# Patient Record
(demographics unavailable — no encounter records)

---

## 2024-10-24 NOTE — HISTORY OF PRESENT ILLNESS
[de-identified] : cough x 1 week, vomit starting this am as per dad. No other c/o.  [FreeTextEntry6] : Coughing x 1 week, now vomiting since this morning. Complaining of sore neck. Tolerated lunch. He is afebrile.

## 2024-10-24 NOTE — DISCUSSION/SUMMARY
[FreeTextEntry1] : Discussed with family that current strep testing is NEGATIVE . A regular throat culture will be sent to the lab for further testing, with results obtained in 24-48 hours. If the throat culture is positive, a prescription will be sent to the designated  pharmacy. If the throat culture is negative after 48 hours and the patient is not better, the child should be rechecked. Discussed with family the etiology, natural course and treatment options for sore throat-pharyngitis. Recommended OTC therapy with pain/fever control products, topical products (lozenges, sprays, gargles) as needed per manufacturers recommendation.  If throat culture is positive give- Cefadroxil 250mg/5ml, 7.5ml BID x 10 days

## 2024-10-25 NOTE — REVIEW OF SYSTEMS
[Headache] : no headache [Ear Pain] : ear pain [Nasal Congestion] : nasal congestion [Sore Throat] : sore throat [Cough] : cough [Shortness of Breath] : no shortness of breath [Vomiting] : vomiting [Diarrhea] : no diarrhea [Abdominal Pain] : no abdominal pain [Negative] : Skin

## 2024-10-25 NOTE — HISTORY OF PRESENT ILLNESS
[de-identified] : dad reports pt w cough x 1 week, vom yesterday, seen in office yesterday -strep negative, today pt c/o L ear pain, +UO, appetite OK, dad denies fever, SOB, wheezing, dc from ear; ibuprofen given at 1230pm today [FreeTextEntry6] : Cough and congestion x 1 week, intermittent vomiting, ST and now has R ear pain.  He was seen yesterday and had a negative strep test. No fevers.

## 2024-10-25 NOTE — DISCUSSION/SUMMARY
[FreeTextEntry1] :  Recommend supportive care including Mucinex, Flonase, antipyretics, fluids, and nasal saline followed by nasal suction. Return if symptoms worsen or persist.

## 2025-02-17 NOTE — HISTORY OF PRESENT ILLNESS
[de-identified] : Stomach pain, sore throat, diarrhea and fever. Ibuprofen given today  [FreeTextEntry6] : BAHMAN  is here today for a history of fever, cough, myalgias symptoms started yesterday night fever hot last night tactile,  today 102, cough  muscle aches  diarrhea x1 appetite normal no vomiting, has sore throat sibling had pneumonia about 1.5 weeks ago returned from vacation

## 2025-02-17 NOTE — HISTORY OF PRESENT ILLNESS
[de-identified] : Stomach pain, sore throat, diarrhea and fever. Ibuprofen given today  [FreeTextEntry6] : BAHMAN  is here today for a history of fever, cough, myalgias symptoms started yesterday night fever hot last night tactile,  today 102, cough  muscle aches  diarrhea x1 appetite normal no vomiting, has sore throat sibling had pneumonia about 1.5 weeks ago returned from vacation

## 2025-02-17 NOTE — PHYSICAL EXAM
[TextEntry] : Gen: Awake, alert,  In no acute distress , well appearing Eyes: no periorbital swelling Ears : right  External Auditory Canal:  Normal. Tympanic Membrane:  Normal            left External Auditory Canal:  Normal   Tympanic Membrane:  Normal Nose:  nasal discharge clear Pharynx; erythema , no sores no trismus  Neck supple Lymph: anterior and submandibular glands no lymphadenopathy Cardiac : normal rate, regular rhythm, S1,S2 normal, no murmur Lungs: clear to auscultation, no crackles no wheeze, no grunting flaring or retractions Abdomen: soft, not tender, no hepatosplenomegaly

## 2025-02-17 NOTE — DISCUSSION/SUMMARY
[FreeTextEntry1] : Parent/guardian aware rapid Covid 19 test negative rapid strep negative Discussed with family/pt that rapid influenza testing was POSITIVE for Influenza  A   Handwashing and Infection control     Symptomatic treatment discussed importance of fluid intake,  otc fever reducer. Next  Visit  - recheck if still febrile or symptomatic in 2 to 3 days, earlier if worsening symptoms Tamiflu pros and cons discussed, side effects including hives, hallucinations, vomiting and abdominal pain symptoms onset less than 48 hours, dad would like tamiflu. Rx sent need to start today if develops side effects stop medication. Dad would like sibling sister Chapo to receive prophylaxis medication due to exposure, 3 years old, history of recent pneumonia and rsv, medication sent once a day for  7 days. If develops side effects stop medication time spent 30 minutes discussed consider flu vaccine in future

## 2025-05-08 NOTE — HISTORY OF PRESENT ILLNESS
[de-identified] : right swollen with discharge  [FreeTextEntry6] : In addition to above: Sneezing, runny nose, watery, "teary" eye Small amount of "white" discharge in right eye at school today Spent time outdoors while in school No fevers, cough, V/D +HX of seasonal allergies Currently using Claritin

## 2025-05-08 NOTE — CARE PLAN
[Care Plan reviewed and provided to patient/caregiver] : Care plan reviewed and provided to patient/caregiver [Care Plan managed/Care coordinated by: ___] : Care plan managed/Care coordinated by: [unfilled] [Understands and communicates without difficulty] : Patient/Caregiver understands and communicates without difficulty [FreeTextEntry2] : To wash hands and change clothes after spending time outdoors Avoid rubbing eyes [FreeTextEntry3] : Recommended treatment: Zyrtec (Cetirizine) 5 mg once a day Flonase (Fluticasone) 1 spray(s) each nostril 1 time(s) a day Pataday (Olopatadine) 0.2% 1 gtt each eye daily Wash hands, change clothes, take a shower after spending time outdoors

## 2025-05-08 NOTE — HISTORY OF PRESENT ILLNESS
[de-identified] : right swollen with discharge  [FreeTextEntry6] : In addition to above: Sneezing, runny nose, watery, "teary" eye Small amount of "white" discharge in right eye at school today Spent time outdoors while in school No fevers, cough, V/D +HX of seasonal allergies Currently using Claritin

## 2025-05-08 NOTE — PHYSICAL EXAM
[TextEntry] : General: alert and active in no apparent distress Eyes: mild periorbital edema and erythema BL, R>L, BL conjunctival injection with copious watery discharge and scant mucoid discharge in LEFT eye, EOMI, PERRL Ears: TMs translucent bilaterally, normal landmarks noted, normal canals Nose: clear rhinorrhea, mucosal edema BL, congestion OP: no tonsillar enlargement/exudate, no lesions, no posterior pharyngeal erythema Neck: supple, no adenopathy Lungs: clear to auscultation bilaterally, good air exchange, no retractions, comfortable WOB CVS: Normal rate, regular rhythm, no murmur Skin: No rashes, lesions or skin changes

## 2025-05-08 NOTE — PLAN
[TextEntry] : Explained NOT bacterial conjunctivitis - Recommended treatment: Zyrtec (Cetirizine) 5 mg once a day Flonase (Fluticasone) 1 spray(s) each nostril 1 time(s) a day Pataday (Olopatadine) 0.2% 1 gtt each eye daily - Discussed environmental control measures - Follow up if symptoms persist or worsen